# Patient Record
Sex: FEMALE | URBAN - METROPOLITAN AREA
[De-identification: names, ages, dates, MRNs, and addresses within clinical notes are randomized per-mention and may not be internally consistent; named-entity substitution may affect disease eponyms.]

---

## 2023-04-14 ENCOUNTER — OFFICE VISIT (OUTPATIENT)
Dept: OTOLARYNGOLOGY | Facility: CLINIC | Age: 63
End: 2023-04-14

## 2023-04-14 DIAGNOSIS — H93.13 BILATERAL TINNITUS: Primary | ICD-10-CM

## 2023-04-14 DIAGNOSIS — J30.9 ALLERGIC RHINITIS WITH POSTNASAL DRIP: ICD-10-CM

## 2023-04-14 DIAGNOSIS — H92.01 OTALGIA, RIGHT: ICD-10-CM

## 2023-04-14 DIAGNOSIS — R09.82 ALLERGIC RHINITIS WITH POSTNASAL DRIP: ICD-10-CM

## 2023-04-14 PROCEDURE — 99203 OFFICE O/P NEW LOW 30 MIN: CPT | Performed by: SPECIALIST

## 2023-04-14 RX ORDER — ACETAMINOPHEN 500 MG
TABLET ORAL AS DIRECTED
COMMUNITY

## 2023-04-14 NOTE — PATIENT INSTRUCTIONS
Your ears were fully cleaned. You can reduce your sodium and caffeine for the bilateral tinnitus. We reviewed your audiogram done on 8/23/2021 which showed bilateral moderate high-frequency sensorineural hearing loss. This was slightly worse left than right. No findings on your exam to explain the right otalgia. You can try Zyrtec at bedtime for allergic rhinitis. Follow-up with any additional questions or problems.

## 2024-02-20 ENCOUNTER — OFFICE VISIT (OUTPATIENT)
Dept: OTOLARYNGOLOGY | Facility: CLINIC | Age: 64
End: 2024-02-20

## 2024-02-20 DIAGNOSIS — H93.13 BILATERAL TINNITUS: Primary | ICD-10-CM

## 2024-02-20 DIAGNOSIS — J34.89 NASAL CRUSTING: ICD-10-CM

## 2024-02-20 DIAGNOSIS — H91.8X3 ASYMMETRICAL HEARING LOSS: ICD-10-CM

## 2024-02-20 PROCEDURE — 99213 OFFICE O/P EST LOW 20 MIN: CPT | Performed by: SPECIALIST

## 2024-02-20 RX ORDER — DOXYCYCLINE HYCLATE 100 MG/1
100 CAPSULE ORAL 2 TIMES DAILY
Qty: 20 CAPSULE | Refills: 0 | Status: SHIPPED | OUTPATIENT
Start: 2024-02-20

## 2024-02-20 RX ORDER — PREDNISONE 20 MG/1
20 TABLET ORAL DAILY
Qty: 3 TABLET | Refills: 0 | Status: SHIPPED | OUTPATIENT
Start: 2024-02-20

## 2024-02-21 NOTE — PROGRESS NOTES
Sonia Chavez is a 63 year old female.   Chief Complaint   Patient presents with    Follow - Up     bilateral tinnitus     HPI:   Patient for follow-up with bilateral tinnitus.  Is also having some nasal congestion.    Current Outpatient Medications   Medication Sig Dispense Refill    doxycycline 100 MG Oral Cap Take 1 capsule (100 mg total) by mouth 2 (two) times daily. 20 capsule 0    predniSONE 20 MG Oral Tab Take 1 tablet (20 mg total) by mouth daily. 3 tablet 0    acetaminophen 500 MG Oral Tab Take by mouth As Directed.      Cyanocobalamin ER 1000 MCG Oral Tab CR Take by mouth As Directed.        History reviewed. No pertinent past medical history.   Social History:  Social History     Socioeconomic History    Marital status:    Tobacco Use    Smoking status: Never    Smokeless tobacco: Never   Vaping Use    Vaping Use: Never used   Substance and Sexual Activity    Alcohol use: Yes    Drug use: Never        REVIEW OF SYSTEMS:   GENERAL HEALTH: feels well otherwise  GENERAL : denies fever, chills, sweats, weight loss, weight gain  SKIN: denies any unusual skin lesions or rashes  RESPIRATORY: denies shortness of breath with exertion  NEURO: denies headaches    EXAM:   There were no vitals taken for this visit.  System Details   Skin Inspection - Normal.   Constitutional Overall appearance - Normal.   Head/Face Facial features - Normal. Eyebrows - Normal. Skull - Normal.   Eyes Conjunctiva - Right: Normal, Left: Normal. Pupil - Right: Normal, Left: Normal.    Ears Inspection - Right: Normal, Left: Normal.   Canal - Right: Normal, Left: Normal.   TM - Right: Normal, Left: Normal.   Nasal External nose - Normal.   Nasal septum - Normal.  Turbinates -right nasal crusting   Oral/Oropharynx Lips - Normal, Tonsils - Normal, Tongue - Normal    Neck Exam Inspection - Normal. Palpation - Normal. Parotid gland - Normal. Thyroid gland - Normal.  No carotid bruits by auscultation   Lymph Detail Submental.  Submandibular. Anterior cervical. Posterior cervical. Supraclavicular all without enlargement   Psychiatric Orientation - Oriented to time, place, person & situation. Appropriate mood and affect.   Neurological Memory - Normal. Cranial nerves - Cranial nerves II through XII grossly intact.     ASSESSMENT AND PLAN:   1. Bilateral tinnitus  Audiogram will be recommended on follow-up visit in [place] office in 3 weeks time    2. Asymmetrical hearing loss  Last audiogram done on August 23, 2021 which showed an asymmetric hearing loss left slightly worse than right    3. Nasal crusting  Is on prednisone and doxycycline.        The patient indicates understanding of these issues and agrees to the plan.      Shirin Rausch MD  2/20/2024  9:40 PM

## 2024-02-21 NOTE — PATIENT INSTRUCTIONS
I placed you on doxycycline and prednisone for your right nasal crusting.  Follow-up in my [place] office in 3 weeks time.  I would recommend a repeat audiogram for the tinnitus and also the slight asymmetric hearing loss left worse than right.

## 2024-03-14 ENCOUNTER — OFFICE VISIT (OUTPATIENT)
Dept: AUDIOLOGY | Facility: CLINIC | Age: 64
End: 2024-03-14

## 2024-03-14 ENCOUNTER — OFFICE VISIT (OUTPATIENT)
Dept: OTOLARYNGOLOGY | Facility: CLINIC | Age: 64
End: 2024-03-14
Payer: COMMERCIAL

## 2024-03-14 DIAGNOSIS — H90.3 SENSORINEURAL HEARING LOSS, BILATERAL: Primary | ICD-10-CM

## 2024-03-14 DIAGNOSIS — H93.13 BILATERAL TINNITUS: Primary | ICD-10-CM

## 2024-03-14 DIAGNOSIS — H91.8X3 ASYMMETRICAL HEARING LOSS: ICD-10-CM

## 2024-03-14 DIAGNOSIS — H93.19 TINNITUS, UNSPECIFIED LATERALITY: ICD-10-CM

## 2024-03-14 PROCEDURE — 92567 TYMPANOMETRY: CPT | Performed by: AUDIOLOGIST

## 2024-03-14 PROCEDURE — 99213 OFFICE O/P EST LOW 20 MIN: CPT | Performed by: SPECIALIST

## 2024-03-14 PROCEDURE — 92557 COMPREHENSIVE HEARING TEST: CPT | Performed by: AUDIOLOGIST

## 2024-03-15 NOTE — PROGRESS NOTES
Sonia Chavez is a 63 year old female.   Chief Complaint   Patient presents with    Follow - Up     Patient is here due sinus infection.     HPI:   Patient with bilateral tinnitus.  Asymmetric hearing loss on her audiogram done this 23rd 2021.  This is a repeat to assess for stability.    Current Outpatient Medications   Medication Sig Dispense Refill    mupirocin 2 % External Ointment Apply with cotton swab to right nares twice daily as needed for crusting 15 g 2    doxycycline 100 MG Oral Cap Take 1 capsule (100 mg total) by mouth 2 (two) times daily. 20 capsule 0    predniSONE 20 MG Oral Tab Take 1 tablet (20 mg total) by mouth daily. 3 tablet 0    acetaminophen 500 MG Oral Tab Take by mouth As Directed.      Cyanocobalamin ER 1000 MCG Oral Tab CR Take by mouth As Directed.        History reviewed. No pertinent past medical history.   Social History:  Social History     Socioeconomic History    Marital status:    Tobacco Use    Smoking status: Never    Smokeless tobacco: Never   Vaping Use    Vaping Use: Never used   Substance and Sexual Activity    Alcohol use: Yes    Drug use: Never        REVIEW OF SYSTEMS:   GENERAL HEALTH: feels well otherwise  GENERAL : denies fever, chills, sweats, weight loss, weight gain  SKIN: denies any unusual skin lesions or rashes  RESPIRATORY: denies shortness of breath with exertion  NEURO: denies headaches    EXAM:   There were no vitals taken for this visit.  System Details   Skin Inspection - Normal.   Constitutional Overall appearance - Normal.   Head/Face Facial features - Normal. Eyebrows - Normal. Skull - Normal.   Eyes Conjunctiva - Right: Normal, Left: Normal. Pupil - Right: Normal, Left: Normal.    Ears Inspection - Right: Normal, Left: Normal.   Canal - Right: Normal, Left: Normal.   TM - Right: Normal, Left: Normal.   Nasal External nose - Normal.   Nasal septum - Normal.  Turbinates - Normal.   Oral/Oropharynx Lips - Normal, Tonsils - Normal, Tongue - Normal     Neck Exam Inspection - Normal. Palpation - Normal. Parotid gland - Normal. Thyroid gland - Normal.  No carotid bruits by auscultation   Lymph Detail Submental. Submandibular. Anterior cervical. Posterior cervical. Supraclavicular all without enlargement   Psychiatric Orientation - Oriented to time, place, person & situation. Appropriate mood and affect.   Neurological Memory - Normal. Cranial nerves - Cranial nerves II through XII grossly intact.     ASSESSMENT AND PLAN:   1. Bilateral tinnitus  Audiogram was reviewed August 23, 2021 with today's audiogram.  Both show a slightly asymmetric hearing loss left worse than right.  Word discrimination on both tests at 100%.  For tinnitus can reduce sodium and caffeine.  Can also try Lipoflavonoid.    2. Asymmetrical hearing loss  Left slightly worse than right but stable.  Follow-up in 1 year's time, sooner if problems.        The patient indicates understanding of these issues and agrees to the plan.      Shirin Rausch MD  3/14/2024  8:41 PM

## 2024-03-15 NOTE — PATIENT INSTRUCTIONS
Your audiogram done today was compared to 1 done on 8/23/2021.  There is a persistent slight asymmetric hearing loss left worse than right.  Her discrimination scores are perfect at 100% bilaterally.  For tinnitus, you can reduce sodium and caffeine.  You can also try Lipoflavonoid.  Follow-up in 1 year's time, sooner if problems.